# Patient Record
Sex: FEMALE
[De-identification: names, ages, dates, MRNs, and addresses within clinical notes are randomized per-mention and may not be internally consistent; named-entity substitution may affect disease eponyms.]

---

## 2020-09-24 ENCOUNTER — TRANSCRIPTION ENCOUNTER (OUTPATIENT)
Age: 20
End: 2020-09-24

## 2021-09-11 ENCOUNTER — TRANSCRIPTION ENCOUNTER (OUTPATIENT)
Age: 21
End: 2021-09-11

## 2021-10-22 ENCOUNTER — TRANSCRIPTION ENCOUNTER (OUTPATIENT)
Age: 21
End: 2021-10-22

## 2022-04-15 ENCOUNTER — APPOINTMENT (OUTPATIENT)
Dept: ULTRASOUND IMAGING | Facility: CLINIC | Age: 22
End: 2022-04-15
Payer: COMMERCIAL

## 2022-04-15 ENCOUNTER — TRANSCRIPTION ENCOUNTER (OUTPATIENT)
Age: 22
End: 2022-04-15

## 2022-04-15 ENCOUNTER — APPOINTMENT (OUTPATIENT)
Dept: ULTRASOUND IMAGING | Facility: CLINIC | Age: 22
End: 2022-04-15

## 2022-04-15 PROCEDURE — 76604 US EXAM CHEST: CPT

## 2023-03-16 ENCOUNTER — NON-APPOINTMENT (OUTPATIENT)
Age: 23
End: 2023-03-16

## 2023-03-18 ENCOUNTER — EMERGENCY (EMERGENCY)
Facility: HOSPITAL | Age: 23
LOS: 1 days | Discharge: LEFT BEFORE DISCHARGE | End: 2023-03-18
Attending: STUDENT IN AN ORGANIZED HEALTH CARE EDUCATION/TRAINING PROGRAM | Admitting: STUDENT IN AN ORGANIZED HEALTH CARE EDUCATION/TRAINING PROGRAM
Payer: COMMERCIAL

## 2023-03-18 VITALS
OXYGEN SATURATION: 98 % | DIASTOLIC BLOOD PRESSURE: 54 MMHG | RESPIRATION RATE: 17 BRPM | HEART RATE: 71 BPM | TEMPERATURE: 98 F | SYSTOLIC BLOOD PRESSURE: 112 MMHG

## 2023-03-18 VITALS
SYSTOLIC BLOOD PRESSURE: 102 MMHG | TEMPERATURE: 99 F | RESPIRATION RATE: 16 BRPM | OXYGEN SATURATION: 100 % | DIASTOLIC BLOOD PRESSURE: 65 MMHG | HEART RATE: 62 BPM

## 2023-03-18 LAB
ALBUMIN SERPL ELPH-MCNC: 3.6 G/DL — SIGNIFICANT CHANGE UP (ref 3.3–5)
ALP SERPL-CCNC: 43 U/L — SIGNIFICANT CHANGE UP (ref 40–120)
ALT FLD-CCNC: 7 U/L — SIGNIFICANT CHANGE UP (ref 4–33)
ANION GAP SERPL CALC-SCNC: 10 MMOL/L — SIGNIFICANT CHANGE UP (ref 7–14)
APPEARANCE UR: CLEAR — SIGNIFICANT CHANGE UP
APTT BLD: 27.1 SEC — SIGNIFICANT CHANGE UP (ref 27–36.3)
AST SERPL-CCNC: 16 U/L — SIGNIFICANT CHANGE UP (ref 4–32)
BACTERIA # UR AUTO: NEGATIVE — SIGNIFICANT CHANGE UP
BASOPHILS # BLD AUTO: 0.02 K/UL — SIGNIFICANT CHANGE UP (ref 0–0.2)
BASOPHILS NFR BLD AUTO: 0.3 % — SIGNIFICANT CHANGE UP (ref 0–2)
BILIRUB SERPL-MCNC: 0.9 MG/DL — SIGNIFICANT CHANGE UP (ref 0.2–1.2)
BILIRUB UR-MCNC: NEGATIVE — SIGNIFICANT CHANGE UP
BLD GP AB SCN SERPL QL: NEGATIVE — SIGNIFICANT CHANGE UP
BUN SERPL-MCNC: 5 MG/DL — LOW (ref 7–23)
CALCIUM SERPL-MCNC: 8.7 MG/DL — SIGNIFICANT CHANGE UP (ref 8.4–10.5)
CHLORIDE SERPL-SCNC: 106 MMOL/L — SIGNIFICANT CHANGE UP (ref 98–107)
CO2 SERPL-SCNC: 22 MMOL/L — SIGNIFICANT CHANGE UP (ref 22–31)
COLOR SPEC: SIGNIFICANT CHANGE UP
CREAT SERPL-MCNC: 0.62 MG/DL — SIGNIFICANT CHANGE UP (ref 0.5–1.3)
DIFF PNL FLD: NEGATIVE — SIGNIFICANT CHANGE UP
EGFR: 127 ML/MIN/1.73M2 — SIGNIFICANT CHANGE UP
EOSINOPHIL # BLD AUTO: 0.07 K/UL — SIGNIFICANT CHANGE UP (ref 0–0.5)
EOSINOPHIL NFR BLD AUTO: 1 % — SIGNIFICANT CHANGE UP (ref 0–6)
EPI CELLS # UR: 1 /HPF — SIGNIFICANT CHANGE UP (ref 0–5)
FLUAV AG NPH QL: SIGNIFICANT CHANGE UP
FLUBV AG NPH QL: SIGNIFICANT CHANGE UP
GLUCOSE SERPL-MCNC: 81 MG/DL — SIGNIFICANT CHANGE UP (ref 70–99)
GLUCOSE UR QL: NEGATIVE — SIGNIFICANT CHANGE UP
HCG SERPL-ACNC: <5 MIU/ML — SIGNIFICANT CHANGE UP
HCT VFR BLD CALC: 37.1 % — SIGNIFICANT CHANGE UP (ref 34.5–45)
HGB BLD-MCNC: 12 G/DL — SIGNIFICANT CHANGE UP (ref 11.5–15.5)
HYALINE CASTS # UR AUTO: 0 /LPF — SIGNIFICANT CHANGE UP (ref 0–7)
IANC: 4.29 K/UL — SIGNIFICANT CHANGE UP (ref 1.8–7.4)
IMM GRANULOCYTES NFR BLD AUTO: 0.1 % — SIGNIFICANT CHANGE UP (ref 0–0.9)
INR BLD: 1.1 RATIO — SIGNIFICANT CHANGE UP (ref 0.88–1.16)
KETONES UR-MCNC: NEGATIVE — SIGNIFICANT CHANGE UP
LEUKOCYTE ESTERASE UR-ACNC: NEGATIVE — SIGNIFICANT CHANGE UP
LYMPHOCYTES # BLD AUTO: 2.19 K/UL — SIGNIFICANT CHANGE UP (ref 1–3.3)
LYMPHOCYTES # BLD AUTO: 30.4 % — SIGNIFICANT CHANGE UP (ref 13–44)
MCHC RBC-ENTMCNC: 29.3 PG — SIGNIFICANT CHANGE UP (ref 27–34)
MCHC RBC-ENTMCNC: 32.3 GM/DL — SIGNIFICANT CHANGE UP (ref 32–36)
MCV RBC AUTO: 90.5 FL — SIGNIFICANT CHANGE UP (ref 80–100)
MONOCYTES # BLD AUTO: 0.63 K/UL — SIGNIFICANT CHANGE UP (ref 0–0.9)
MONOCYTES NFR BLD AUTO: 8.7 % — SIGNIFICANT CHANGE UP (ref 2–14)
NEUTROPHILS # BLD AUTO: 4.29 K/UL — SIGNIFICANT CHANGE UP (ref 1.8–7.4)
NEUTROPHILS NFR BLD AUTO: 59.5 % — SIGNIFICANT CHANGE UP (ref 43–77)
NITRITE UR-MCNC: NEGATIVE — SIGNIFICANT CHANGE UP
NRBC # BLD: 0 /100 WBCS — SIGNIFICANT CHANGE UP (ref 0–0)
NRBC # FLD: 0 K/UL — SIGNIFICANT CHANGE UP (ref 0–0)
PH UR: 6.5 — SIGNIFICANT CHANGE UP (ref 5–8)
PLATELET # BLD AUTO: 282 K/UL — SIGNIFICANT CHANGE UP (ref 150–400)
POTASSIUM SERPL-MCNC: 3.7 MMOL/L — SIGNIFICANT CHANGE UP (ref 3.5–5.3)
POTASSIUM SERPL-SCNC: 3.7 MMOL/L — SIGNIFICANT CHANGE UP (ref 3.5–5.3)
PROT SERPL-MCNC: 6.3 G/DL — SIGNIFICANT CHANGE UP (ref 6–8.3)
PROT UR-MCNC: NEGATIVE — SIGNIFICANT CHANGE UP
PROTHROM AB SERPL-ACNC: 12.8 SEC — SIGNIFICANT CHANGE UP (ref 10.5–13.4)
RBC # BLD: 4.1 M/UL — SIGNIFICANT CHANGE UP (ref 3.8–5.2)
RBC # FLD: 12.8 % — SIGNIFICANT CHANGE UP (ref 10.3–14.5)
RBC CASTS # UR COMP ASSIST: 5 /HPF — HIGH (ref 0–4)
RH IG SCN BLD-IMP: POSITIVE — SIGNIFICANT CHANGE UP
RSV RNA NPH QL NAA+NON-PROBE: SIGNIFICANT CHANGE UP
SARS-COV-2 RNA SPEC QL NAA+PROBE: SIGNIFICANT CHANGE UP
SODIUM SERPL-SCNC: 138 MMOL/L — SIGNIFICANT CHANGE UP (ref 135–145)
SP GR SPEC: 1.02 — SIGNIFICANT CHANGE UP (ref 1.01–1.05)
UROBILINOGEN FLD QL: SIGNIFICANT CHANGE UP
WBC # BLD: 7.21 K/UL — SIGNIFICANT CHANGE UP (ref 3.8–10.5)
WBC # FLD AUTO: 7.21 K/UL — SIGNIFICANT CHANGE UP (ref 3.8–10.5)
WBC UR QL: 1 /HPF — SIGNIFICANT CHANGE UP (ref 0–5)

## 2023-03-18 PROCEDURE — 99285 EMERGENCY DEPT VISIT HI MDM: CPT

## 2023-03-18 PROCEDURE — 74177 CT ABD & PELVIS W/CONTRAST: CPT | Mod: 26,MD

## 2023-03-18 PROCEDURE — 76856 US EXAM PELVIC COMPLETE: CPT | Mod: 26

## 2023-03-18 RX ORDER — CIPROFLOXACIN LACTATE 400MG/40ML
1 VIAL (ML) INTRAVENOUS
Qty: 14 | Refills: 0
Start: 2023-03-18 | End: 2023-03-24

## 2023-03-18 RX ORDER — METRONIDAZOLE 500 MG
1 TABLET ORAL
Qty: 21 | Refills: 0
Start: 2023-03-18 | End: 2023-03-24

## 2023-03-18 RX ORDER — IOHEXOL 300 MG/ML
30 INJECTION, SOLUTION INTRAVENOUS ONCE
Refills: 0 | Status: COMPLETED | OUTPATIENT
Start: 2023-03-18 | End: 2023-03-18

## 2023-03-18 RX ADMIN — IOHEXOL 30 MILLILITER(S): 300 INJECTION, SOLUTION INTRAVENOUS at 08:42

## 2023-03-18 NOTE — ED PROVIDER NOTE - NSFOLLOWUPCLINICS_GEN_ALL_ED_FT
[facility]  General Surgery  [address] [place] [address] - 3rd [place] [address]  Phone: [phone]  Fax:

## 2023-03-18 NOTE — ED PROVIDER NOTE - NSFOLLOWUPINSTRUCTIONS_ED_ALL_ED_FT
Please  the antibiotics Metronidazole and Ciprofloxacin from your pharmacy and take as prescribed.    Someone will reach out to you once the surgeon gives their final recommendations. If you don't hear back by tomorrow, please call the surgery clinic. Their phone number is attached below.    You can use 500-1000mg Tylenol every 6 hours for pain - as needed.  This is an over-the-counter medications - please respect the warnings on the label. This medication come with certain risks and side effects that you need to discuss with your doctor, especially if you are taking it for a prolonged period.    You can use 400-600mg Ibuprofen (such as motrin or advil) every 6 to 8 hours as needed for pain control.  Take ibuprofen with food or milk to lessen stomach upset.  This is an over-the-counter medication please respect the warnings on the label. All medications come with certain risks and side effects that you need to discuss with your doctor, especially if you are taking them for a prolonged period.      Appendicitis    WHAT YOU NEED TO KNOW:    What is appendicitis? Appendicitis is inflammation of your appendix. The appendix is a small pouch. It is attached to the large intestine on the lower right side of the abdomen. The appendix may get blocked by food or by part of a bowel movement that becomes hard. The appendix can become infected with bacteria or a virus. Appendicitis can also be caused by a parasite or tumor. You will need immediate care to prevent a ruptured appendix. A ruptured appendix can cause bacteria to leak into the abdomen. This can lead to a serious infection called peritonitis.  Abdominal Organs         What are the signs and symptoms of appendicitis? Symptoms may start suddenly. The most common symptom is pain that starts at the belly button and moves to the lower right side of your abdomen. The pain worsens when you touch your abdomen, move, sneeze, cough, or take a deep breath. You may also have any of the following:   •Abdomen that feels hard      •Diarrhea or constipation      •Loss of appetite       •Nausea or vomiting       •Fever      How is appendicitis diagnosed? Your healthcare provider will examine you and check for pain or tenderness in your abdomen. You may also need any of the following:   •Blood tests may show if you have an infection.       •A urine test may be used to check for a urinary tract infection or kidney stone.       •CT or ultrasound pictures of your abdomen may be used to check your appendix. You may be given contrast liquid to help your appendix show up better in the pictures. Tell the healthcare provider if you have ever had an allergic reaction to contrast liquid.      How is appendicitis treated?   •Medicines may be given to fight an infection or to manage pain. Ask your healthcare provider how to take pain medicine safely.      •Drainage may be needed if you develop an abscess after a burst appendix. To drain the abscess, your healthcare provider guides a tube through your skin and into the abscess. Infected fluid drains through the tube.       •An appendectomy is surgery to remove your appendix. Your appendix may be removed through small incisions in your abdomen. If your appendix has burst, you may need an open appendectomy. A single, larger incision is made to remove the appendix and clean out the abdomen.       When should I call my doctor?   •You have a fever.      •You have severe pain in your abdomen.      •You are vomiting and cannot keep food down.      •You have abdominal pain that does not go away, even after you take medicine.      •You have chills, a cough, or feel weak and achy.      •You have trouble having a bowel movement, or you have diarrhea.      •You have questions or concerns about your condition or care.      CARE AGREEMENT:    You have the right to help plan your care. Learn about your health condition and how it may be treated. Discuss treatment options with your healthcare providers to decide what care you want to receive. You always have the right to refuse treatment.

## 2023-03-18 NOTE — ED ADULT NURSE NOTE - CHIEF COMPLAINT QUOTE
Pt received as a transfer from [facility] for GYN for possible PID or possible appendicitis. Pt c/o lower abd pain since 5am yesterday. pt went to [facility] and CT showed inflammatory appearance in pelvis with appendic not being visualized. didn't have US at other hospital. No complaints of chest pain, headache, nausea, dizziness, vomiting  SOB, fever, chills verbalized..

## 2023-03-18 NOTE — ED PROVIDER NOTE - PROGRESS NOTE DETAILS
Zuleika GAMINO: Was alerted by ultrasound tech that patient was unable to fully tolerate transvaginal ultrasound, she then endorsed that she has never had intercourse, per Dr. Flores  declined pelvic exam. Dr. Cox: Patient CT with p.o. and IV contrast performed and shows findings that are concerning for appendicitis including appendicolith and dilated appendix.  No inflammatory changes are noted.  Patient still refusing blood work until evaluated by surgical consult which has been called.  Once patient was evaluated by surgery preop testing was requested.  Once results were available surgical team reevaluated patient.  At this time surgical attending Dr. Andrade is in the OR and would like to personally examine the patient.  Patient and family have been made aware of this.  They are currently not willing to wait for this evaluation and are requesting to leave AGAINST MEDICAL ADVICE.  Will discuss risks and benefits with them again. Wes Kan MD (PGY-3): Called surgery team multiple times. Attending surgery physician is still in the OR and hasn't been able to give final recs regarding this pt. Pt wants to leave AMA. Pt signed AMA paperwork. Will prescribe abx and give return precautions. Explained that we will call pt with any additional recs once surgery attending physician gives his/her final recs. Pt was re-evaluated at bedside, VSS. Results were discussed with patient as well as return precautions and follow up plan with PCP and general surgery. Time was taken to answer any questions that the patient had before providing them with discharge paperwork. Wes Kan MD (PGY-3): Called surgery team multiple times. Attending surgery physician is still in the OR and hasn't been able to give final recs regarding this pt. Pt wants to leave AMA. Pt left before signing AMA paperwork. Called pt and her family and spoke to them on the phone. Had detailed discussion regarding the following: Prescribed abx and gave return precautions. Explained that we will call pt with any additional recs once surgery attending physician gives his/her final recs. Pt was re-evaluated at bedside, VSS. Results were discussed with patient as well as return precautions and follow up plan with PCP and general surgery. Time was taken to answer any questions that the patient had.

## 2023-03-18 NOTE — CONSULT NOTE ADULT - ASSESSMENT
23YO F no pmhx p/w Suprapubic abdominal pain for 2 days, with CT showed dilated appendiceal 9mm with possible 3 mm appendicolith within the tip. WBC wnl w/o left shift, no abd in ER now, surgery is consulted to r/o acute appendicitis.    Plan:  - pending attending eval  - plan discussed with Jose Martin Noble PGY-2  ACS/Trauma Surgery  [id number]

## 2023-03-18 NOTE — ED ADULT TRIAGE NOTE - CHIEF COMPLAINT QUOTE
Pt received as a transfer from [facility] for GYN for possible PID or possible appendicitis. Pt c/o lower abd pain since 5am yesterday. pt went to [facility] and CT showed inflammatory appearance in pelvis with appendic not being visualized. didn't have US at other hospital. No complaints of chest pain, headache, nausea, dizziness, vomiting  SOB, fever, chills verbalized..

## 2023-03-18 NOTE — ED ADULT NURSE REASSESSMENT NOTE - NS ED NURSE REASSESS COMMENT FT1
Pt a&ox4, NAD, Respirations are even and unlabored, no signs of respiratory distress.
Received pt from previous shift, A&Ox4, accompanied by mother. Pt is a transfer from [facility], 20G IV noted on RAC. Pt c/o lower abd pain. Breathing even and unlabored. No acute distress noted. Comfort and safety maintained. Will continue to monitor.

## 2023-03-18 NOTE — ED PROVIDER NOTE - CLINICAL SUMMARY MEDICAL DECISION MAKING FREE TEXT BOX
Eileen Flores MD, PGY-3: 23YO F no pmhx p/w Suprapubic abdominal pain, transfer for inconclusive ct a/p. vss, pe no abd ttp, no cva ttp. suspect ovarian torsion vs. appendicitis, plan for tvus, ct a/p, basic labs.

## 2023-03-18 NOTE — ED PROVIDER NOTE - OBJECTIVE STATEMENT
23YO F no pmhx p/w Suprapubic abdominal pain.  Onset 5 AM 1 day prior, woke patient from sleep, associated with nausea.  Patient transferred from outside hospital, had CT abdomen pelvis that was inconclusive, differential for abnormal findings including PID versus appendicitis, although appendix not well visualized.  Labs are significant for mild leukocytosis to 15, UA negative for UTI.  All other lab work unremarkable.  Pregnancy test negative.  Patient denies any abnormal vaginal discharge, LMP end of February, denies dysuria, hematuria, flank pain.  Also denies recent fevers, chest pain, shortness of breath. pt denies surgical hx.

## 2023-03-18 NOTE — ED ADULT NURSE NOTE - ED STAT RN HANDOFF DETAILS
Pt departed AMA w/ full mental capacity to make decisions. Departed w/ steady gait w/ family. Did not wait to sign AMA form.

## 2023-03-18 NOTE — ED PROVIDER NOTE - ATTENDING CONTRIBUTION TO CARE
I have personally performed a face to face medical and diagnostic evaluation of the patient. I have discussed with and reviewed the Resident's note and agree with the History, ROS, Physical Exam and MDM unless otherwise indicated. A brief summary of my personal evaluation and impression can be found below.    Zuleika GAMINO: 22-year-old female, no past medical history, presents with a chief complaint of suprapubic abdominal pain associate with mild right lower quadrant pain, patient was initially seen at an outside hospital, had a CT that showed concern for possible pelvic inflammation on the right side, Possible hydrosalpinx concerning for possible PID,  did not visualize an appendicitis, labs and imaging reviewed from outside hospital documentation, she was also noted to have a leukocytosis between 14 and 15.  Aside from lower abdominal discomfort she has no other complaints, no nausea no vomiting no fever no chills, no urinary or bowel complaints is aside from some mild constipation but is passing gas.  No prior surgical history.  Her urine was negative for urinary tract infection, she is not pregnant from her labs to the outside facility.  LMP last end of February.    All other ROS negative, except as above and as per HPI and ROS section.    VITALS: Initial triage and subsequent vitals have been reviewed by me.  GEN APPEARANCE: WDWN, alert, non-toxic, NAD  HEAD: Atraumatic.  EYES: PERRLa, EOMI, vision grossly intact.   NECK: Supple  CV: RRR, S1S2, no c/r/m/g. Cap refill <2 seconds. No bruits.   LUNGS: CTAB. No abnormal breath sounds.  ABDOMEN: Mild suprapubic and right lower quadrant tenderness.  MSK/EXT: No spinal or extremity point tenderness. No CVA ttp. Pelvis stable. No peripheral edema.  NEURO: Alert, follows commands. Weight bearing normal. Speech normal. Sensation and motor normal x4 extremities.   SKIN: Warm, dry and intact. No rash.  PSYCH: Appropriate    Plan/MDM: Zuleika GAMINO: 22-year-old female, no past medical history, presents with a chief complaint of suprapubic abdominal pain associate with mild right lower quadrant pain, patient was initially seen at an outside hospital, had a CT that showed concern for possible pelvic inflammation on the right side, Possible hydrosalpinx concerning for possible PID,  did not visualize an appendicitis, labs and imaging reviewed from outside hospital documentation, she was also noted to have a leukocytosis between 14 and 15.  Aside from lower abdominal discomfort she has no other complaints, no nausea no vomiting no fever no chills, no urinary or bowel complaints is aside from some mild constipation but is passing gas.  No prior surgical history.  Her urine was negative for urinary tract infection, she is not pregnant from her labs to the outside facility.  LMP last end of February Exam vital signs stable nontoxic-appearing physical exam as above, DDx unclear etiology of patient's abdominal pain, given CT was nondiagnostic at outside hospital we will check labs urine repeat CT with oral contrast, she did not get oral contrast at other facility, get pelvic ultrasound urine meds as needed and reassess and dispo accordingly thereafter.

## 2023-03-18 NOTE — ED PROVIDER NOTE - PATIENT PORTAL LINK FT
You can access the FollowMyHealth Patient Portal offered by [facility] by registering at the following website: [email or web address]. By joining [employer]’s FollowMyHealth portal, you will also be able to view your health information using other applications (apps) compatible with our system.

## 2023-03-18 NOTE — CONSULT NOTE ADULT - SUBJECTIVE AND OBJECTIVE BOX
GENERAL SURGERY CONSULT NOTE  --------------------------------------------------------------------------------------------    HPI: 21YO F no pmhx p/w Suprapubic abdominal pain.  Onset 5 AM 1 day prior, woke patient from sleep, associated with nausea.  Patient transferred from outside hospital, had CT abdomen pelvis that was inconclusive, differential for abnormal findings including PID versus appendicitis, although appendix not well visualized.  Labs are significant for mild leukocytosis to 15, UA negative for UTI.  All other lab work unremarkable.  Pregnancy test negative.  Patient denies any abnormal vaginal discharge, LMP end of February, denies dysuria, hematuria, flank pain.  Also denies recent fevers, chest pain, shortness of breath. pt denies surgical hx.    Surgery is consulted to r/o acute appendicitis. No abd pain or tenderness, WBC 7.5, CT showed ild appendiceal wall thickening with dilatation of the appendix up to 9 mm, without significant inflammatory changes. Possible 3 mm appendicolith within the tip versus oral contrast.Findings are equivocal for appendicitis.      ROS: 10-system review is otherwise negative except HPI above.      PAST MEDICAL & SURGICAL HISTORY:  No pertinent past medical history      No significant past surgical history        FAMILY HISTORY:    [] Family history not pertinent as reviewed with the patient and family    SOCIAL HISTORY:  nonsmoker, denies alcohol use    ALLERGIES: penicillins (Hives)      HOME MEDICATIONS: birth control pills    CURRENT MEDICATIONS  MEDICATIONS (STANDING):   MEDICATIONS (PRN):  --------------------------------------------------------------------------------------------    Vitals:   T(C): 37.1 (23 @ 11:55), Max: 37.1 (23 @ 04:22)  HR: 60 (23 @ 11:55) (60 - 65)  BP: 100/60 (23 @ 11:55) (93/58 - 102/65)  RR: 17 (23 @ 11:55) (16 - 17)  SpO2: 100% (23 @ 11:55) (100% - 100%)  CAPILLARY BLOOD GLUCOSE        CAPILLARY BLOOD GLUCOSE              PHYSICAL EXAM:   General: NAD, Lying in bed comfortably  Neuro: A+Ox3  HEENT: NC/AT, EOMI  Neck: Soft, supple  Cardio: RRR, nml S1/S2  Resp: Good effort, CTA b/l  Thorax: No chest wall tenderness  Breast: No lesions/masses, no drainage  GI/Abd: Soft, NT/ND, no rebound/guarding, no masses palpated  Vascular: All 4 extremities warm.  Skin: Intact, no breakdown  Musculoskeletal: All 4 extremities moving spontaneously, no limitations  --------------------------------------------------------------------------------------------    LABS  CBC ( 12:30)                              12.0                           7.21    )----------------(  282        59.5  % Neutrophils, 30.4  % Lymphocytes, ANC: 4.29                                37.1      BMP ( @ 12:30)             138     |  106     |  5<L>  		Ca++ --      Ca 8.7                ---------------------------------( 81    		Mg --                 3.7     |  22      |  0.62  			Ph --        LFTs ( @ 12:30)      TPro 6.3 / Alb 3.6 / TBili 0.9 / DBili -- / AST 16 / ALT 7 / AlkPhos 43    Coags ( 12:30)  aPTT 27.1 / INR 1.10 / PT 12.8          --------------------------------------------------------------------------------------------    MICROBIOLOGY  Urinalysis ( @ 08:00):     Color: Light Yellow / Appearance: Clear / S.017 / pH: 6.5 / Gluc: Negative / Ketones: Negative / Bili: Negative / Urobili: <2 mg/dL / Protein :Negative / Nitrites: Negative / Leuk.Est: Negative / RBC: 5<H> / WBC: 1 / Sq Epi:  / Non Sq Epi: 1 / Bacteria Negative         --------------------------------------------------------------------------------------------    IMAGING

## 2023-03-18 NOTE — ED ADULT NURSE NOTE - OBJECTIVE STATEMENT
21 y/o female, a&ox4, ambulatory, received to ED with c/o abdominal pain. Pt transferred from [facility] for OBGYN eval, possible PID or appendicitis. Pt reports pelvic pain for the last few days. Pt denies changes in urinary frequency. Pt denying all blood work and providing a urine sample. Respirations are even and unlabored, no signs of respiratory distress.

## 2023-03-19 NOTE — ED POST DISCHARGE NOTE - REASON FOR FOLLOW-UP
Other Patient 's mother called bc she cant access Ct results or US on patient portal. CT Abd : findings equivocal for appendicitis. MILD EARLY APPENDICITIS IS considered. Discussed with mother CT results. Mother aware of results and says daughter not having any pain. Discussed with Mother can email results to her at [email or web address]. Asked mother if I can send results to PMD. Mother gave me name of patient's Pediatricians . [facility] Dr Frankie Bunn  Fax # [phone] Fax confirmation received. Strict return precautions given to patient's mother. Mother verbalizes understanding of what has been told to her. Mother aware of DX of appendicitis.

## 2023-03-20 LAB
C TRACH RRNA SPEC QL NAA+PROBE: SIGNIFICANT CHANGE UP
N GONORRHOEA RRNA SPEC QL NAA+PROBE: SIGNIFICANT CHANGE UP

## 2024-02-21 ENCOUNTER — NON-APPOINTMENT (OUTPATIENT)
Age: 24
End: 2024-02-21

## 2024-10-03 ENCOUNTER — NON-APPOINTMENT (OUTPATIENT)
Age: 24
End: 2024-10-03